# Patient Record
Sex: FEMALE | Race: WHITE | NOT HISPANIC OR LATINO | Employment: STUDENT | URBAN - METROPOLITAN AREA
[De-identification: names, ages, dates, MRNs, and addresses within clinical notes are randomized per-mention and may not be internally consistent; named-entity substitution may affect disease eponyms.]

---

## 2018-04-05 ENCOUNTER — TELEPHONE (OUTPATIENT)
Dept: INTERNAL MEDICINE | Facility: CLINIC | Age: 23
End: 2018-04-05

## 2018-04-05 NOTE — TELEPHONE ENCOUNTER
ADY pt regarding typhoid vaccine and informed her that we do actually have one in the clinic but it is not typically covered under insurance. She verbalized understanding.

## 2018-04-05 NOTE — TELEPHONE ENCOUNTER
----- Message from Rahul Hurst sent at 4/5/2018 12:24 PM CDT -----  Contact: pt   States she's calling to see if we have the vaccines for Typhoid. Pt has an appt tomorrow with  at 10 am and can be reached at [phone]//thanks/dbw

## 2018-04-06 ENCOUNTER — LAB VISIT (OUTPATIENT)
Dept: LAB | Facility: HOSPITAL | Age: 23
End: 2018-04-06
Attending: FAMILY MEDICINE
Payer: COMMERCIAL

## 2018-04-06 ENCOUNTER — OFFICE VISIT (OUTPATIENT)
Dept: INTERNAL MEDICINE | Facility: CLINIC | Age: 23
End: 2018-04-06
Payer: COMMERCIAL

## 2018-04-06 VITALS
DIASTOLIC BLOOD PRESSURE: 72 MMHG | HEIGHT: 68 IN | HEART RATE: 106 BPM | BODY MASS INDEX: 21.33 KG/M2 | TEMPERATURE: 98 F | OXYGEN SATURATION: 99 % | WEIGHT: 140.75 LBS | SYSTOLIC BLOOD PRESSURE: 104 MMHG

## 2018-04-06 DIAGNOSIS — Z00.00 WELLNESS EXAMINATION: ICD-10-CM

## 2018-04-06 DIAGNOSIS — Z91.89 AT RISK FOR INFECTIOUS DISEASE DUE TO RECENT FOREIGN TRAVEL: Primary | ICD-10-CM

## 2018-04-06 DIAGNOSIS — Z29.89 NEED FOR MALARIA PROPHYLAXIS: ICD-10-CM

## 2018-04-06 LAB
ANION GAP SERPL CALC-SCNC: 9 MMOL/L
BASOPHILS # BLD AUTO: 0.05 K/UL
BASOPHILS NFR BLD: 0.9 %
BUN SERPL-MCNC: 15 MG/DL
CALCIUM SERPL-MCNC: 10.1 MG/DL
CHLORIDE SERPL-SCNC: 103 MMOL/L
CHOLEST SERPL-MCNC: 188 MG/DL
CHOLEST/HDLC SERPL: 3.8 {RATIO}
CO2 SERPL-SCNC: 26 MMOL/L
CREAT SERPL-MCNC: 0.9 MG/DL
DIFFERENTIAL METHOD: ABNORMAL
EOSINOPHIL # BLD AUTO: 0.1 K/UL
EOSINOPHIL NFR BLD: 1.1 %
ERYTHROCYTE [DISTWIDTH] IN BLOOD BY AUTOMATED COUNT: 12.2 %
EST. GFR  (AFRICAN AMERICAN): >60 ML/MIN/1.73 M^2
EST. GFR  (NON AFRICAN AMERICAN): >60 ML/MIN/1.73 M^2
GLUCOSE SERPL-MCNC: 76 MG/DL
HCT VFR BLD AUTO: 43.1 %
HDLC SERPL-MCNC: 50 MG/DL
HDLC SERPL: 26.6 %
HGB BLD-MCNC: 13.7 G/DL
IMM GRANULOCYTES # BLD AUTO: 0.06 K/UL
IMM GRANULOCYTES NFR BLD AUTO: 1.1 %
LDLC SERPL CALC-MCNC: 121.4 MG/DL
LYMPHOCYTES # BLD AUTO: 2.4 K/UL
LYMPHOCYTES NFR BLD: 44.1 %
MCH RBC QN AUTO: 28.6 PG
MCHC RBC AUTO-ENTMCNC: 31.8 G/DL
MCV RBC AUTO: 90 FL
MONOCYTES # BLD AUTO: 0.3 K/UL
MONOCYTES NFR BLD: 5.6 %
NEUTROPHILS # BLD AUTO: 2.6 K/UL
NEUTROPHILS NFR BLD: 47.2 %
NONHDLC SERPL-MCNC: 138 MG/DL
NRBC BLD-RTO: 0 /100 WBC
PLATELET # BLD AUTO: 391 K/UL
PMV BLD AUTO: 9.8 FL
POTASSIUM SERPL-SCNC: 4.3 MMOL/L
RBC # BLD AUTO: 4.79 M/UL
SODIUM SERPL-SCNC: 138 MMOL/L
TRIGL SERPL-MCNC: 83 MG/DL
WBC # BLD AUTO: 5.4 K/UL

## 2018-04-06 PROCEDURE — 90715 TDAP VACCINE 7 YRS/> IM: CPT | Mod: S$GLB,,, | Performed by: FAMILY MEDICINE

## 2018-04-06 PROCEDURE — 36415 COLL VENOUS BLD VENIPUNCTURE: CPT | Mod: PO

## 2018-04-06 PROCEDURE — 85025 COMPLETE CBC W/AUTO DIFF WBC: CPT

## 2018-04-06 PROCEDURE — 80048 BASIC METABOLIC PNL TOTAL CA: CPT

## 2018-04-06 PROCEDURE — 99999 PR PBB SHADOW E&M-EST. PATIENT-LVL III: CPT | Mod: PBBFAC,,, | Performed by: FAMILY MEDICINE

## 2018-04-06 PROCEDURE — 99385 PREV VISIT NEW AGE 18-39: CPT | Mod: 25,S$GLB,, | Performed by: FAMILY MEDICINE

## 2018-04-06 PROCEDURE — 80061 LIPID PANEL: CPT

## 2018-04-06 PROCEDURE — 90471 IMMUNIZATION ADMIN: CPT | Mod: S$GLB,,, | Performed by: FAMILY MEDICINE

## 2018-04-06 RX ORDER — DOXYCYCLINE 100 MG/1
CAPSULE ORAL
Qty: 50 CAPSULE | Refills: 0 | Status: SHIPPED | OUTPATIENT
Start: 2018-04-06

## 2018-04-06 NOTE — PROGRESS NOTES
Subjective:       Patient ID: Hannah Villalobos is a 22 y.o. female.    Chief Complaint: Establish Care and Immunizations    Hannah is here for a wellness visit and to prepare for a trip to [facility] where she will be volunteering for 1 month after she graduates from [employer]. [place]. She has no problems or complaints today. Her period are regular; she take her OCP daily. LMP was 1 week ago.       History reviewed. No pertinent past medical history.  History reviewed. No pertinent surgical history.  Family History   Problem Relation Age of Onset    No Known Problems Mother     No Known Problems Father     No Known Problems Brother      Social History     Social History    Marital status: Single     Spouse name: N/A    Number of children: N/A    Years of education: N/A     Occupational History    Not on file.     Social History Main Topics    Smoking status: Never Smoker    Smokeless tobacco: Never Used    Alcohol use Yes      Comment: once a week     Drug use: No    Sexual activity: Yes     Partners: Male     Birth control/ protection: Other-see comments      Comment: blisovi      Other Topics Concern    Not on file     Social History Narrative    No narrative on file     Review of Systems   Constitutional: Negative for fatigue and unexpected weight change.   HENT: Negative for dental problem and hearing loss.    Eyes: Negative for pain and visual disturbance.   Respiratory: Negative for shortness of breath and wheezing.    Cardiovascular: Negative for chest pain and palpitations.   Gastrointestinal: Negative for abdominal pain, constipation and diarrhea.   Endocrine: Negative for polydipsia and polyuria.   Genitourinary: Negative for difficulty urinating, dysuria and vaginal discharge.   Musculoskeletal: Negative for arthralgias and myalgias.   Skin: Negative for rash and wound.   Neurological: Negative for light-headedness and headaches.   Hematological: Negative.     Psychiatric/Behavioral: Negative for dysphoric mood. The patient is not nervous/anxious.        Objective:      Physical Exam   Constitutional: She is oriented to person, place, and time. She appears well-developed and well-nourished.   HENT:   Head: Normocephalic and atraumatic.   Nose: Nose normal.   Eyes: Conjunctivae and EOM are normal. Pupils are equal, round, and reactive to light.   Neck: Normal range of motion. Neck supple. No thyromegaly present.   Cardiovascular: Normal rate and normal heart sounds.    Pulmonary/Chest: Effort normal and breath sounds normal. No respiratory distress. She has no wheezes. She has no rales.   Abdominal: Soft. Bowel sounds are normal. She exhibits no distension and no mass. There is no tenderness.   Musculoskeletal: Normal range of motion. She exhibits no edema, tenderness or deformity.   Lymphadenopathy:     She has no cervical adenopathy.   Neurological: She is alert and oriented to person, place, and time. No cranial nerve deficit or sensory deficit. Coordination normal.   Skin: Skin is warm and dry. No erythema.   Psychiatric: She has a normal mood and affect. Her behavior is normal.   Nursing note and vitals reviewed.      Assessment:       1. At risk for infectious disease due to recent foreign travel    2. Wellness examination    3. Need for malaria prophylaxis        Plan:       At risk for infectious disease due to recent foreign travel  Comments:  Typhoid and Hep A immunization today; malarial prophylaxis prescribed and discussed how to take. Referred her to travel clinic in [place] for additional vaccines: yellow fever and [place] encephalitis. Current on Hep B series.   Immunizations and malarial prophylaxis in accordance with CDC guidelines to travel to the area. Low risk for rabies exposure.    Orders:  -     (In Office Administered) Yellow Fever Vaccine (SQ)  -     (In Office Administered) Japanese Encephalitis Vaccine (IM)  -     typhoid polysaccharide  injection 0.5 mL; Inject 0.5 mLs into the muscle one time.  -     (In Office Administered) Hepatitis A Vaccine (Adult) (IM)  -     doxycycline (VIBRAMYCIN) 100 MG Cap; Start 2 days prior to travel and continue 4 weeks after return home  Dispense: 50 capsule; Refill: 0  -     Tdap Vaccine    Wellness examination  Comments:  Tdap today. GYN appt at [facility] next month. Wishes to get Pap there. She will defer HPV vaccine to that visit or after her trip. Lipid panel, CBC, BMP, U/A today.  Orders:  -     LIPID PANEL; Future; Expected date: 04/06/2018  -     Cancel: Urinalysis  -     CBC auto differential; Future; Expected date: 04/06/2018  -     Basic metabolic panel; Future; Expected date: 04/06/2018  -     C. trachomatis/N. gonorrhoeae by AMP DNA Urine; Future; Expected date: 04/06/2018  -     Urinalysis; Future; Expected date: 04/06/2018    Need for malaria prophylaxis    Doxycycline prescribed.

## 2018-04-10 NOTE — PROGRESS NOTES
Please let this patient know that I have reviewed her labs and everything we checked is normal. We checked her blood count, cholesterol, kidney function, and electrolytes. Please remind her that she can view her results in the Patient Portal if she would like to sign up.

## 2020-02-03 ENCOUNTER — TELEPHONE (OUTPATIENT)
Dept: ADMINISTRATIVE | Facility: HOSPITAL | Age: 25
End: 2020-02-03

## 2020-02-03 NOTE — TELEPHONE ENCOUNTER
Contacted patient to schedule annual visit with PCP Dr Finley. Left voicemail message for patient to [place] [facility] [place]. Ruby